# Patient Record
Sex: FEMALE | Race: WHITE | NOT HISPANIC OR LATINO | Employment: OTHER | URBAN - METROPOLITAN AREA
[De-identification: names, ages, dates, MRNs, and addresses within clinical notes are randomized per-mention and may not be internally consistent; named-entity substitution may affect disease eponyms.]

---

## 2018-09-28 ENCOUNTER — HOSPITAL ENCOUNTER (OUTPATIENT)
Facility: HOSPITAL | Age: 83
Discharge: HOME | End: 2018-10-01
Attending: HOSPITALIST | Admitting: HOSPITALIST
Payer: MEDICARE

## 2018-09-28 DIAGNOSIS — K92.1 MELENA: ICD-10-CM

## 2018-09-28 DIAGNOSIS — H91.90 HEARING LOSS, UNSPECIFIED HEARING LOSS TYPE, UNSPECIFIED LATERALITY: ICD-10-CM

## 2018-09-28 DIAGNOSIS — H33.22 LEFT RETINAL DETACHMENT: ICD-10-CM

## 2018-09-28 DIAGNOSIS — K63.5 POLYP OF COLON, UNSPECIFIED PART OF COLON, UNSPECIFIED TYPE: ICD-10-CM

## 2018-09-28 DIAGNOSIS — K92.2 LOWER GI BLEED: Primary | ICD-10-CM

## 2018-09-28 PROCEDURE — 85025 COMPLETE CBC W/AUTO DIFF WBC: CPT

## 2018-09-28 PROCEDURE — 36415 COLL VENOUS BLD VENIPUNCTURE: CPT

## 2018-09-28 PROCEDURE — 84100 ASSAY OF PHOSPHORUS: CPT

## 2018-09-28 PROCEDURE — 99220 PR INITIAL OBSERVATION CARE,LEVL III: ICD-10-PCS | Mod: ,,, | Performed by: FAMILY MEDICINE

## 2018-09-28 PROCEDURE — 99220 PR INITIAL OBSERVATION CARE,LEVL III: CPT | Mod: ,,, | Performed by: FAMILY MEDICINE

## 2018-09-28 PROCEDURE — G0379 DIRECT REFER HOSPITAL OBSERV: HCPCS

## 2018-09-28 PROCEDURE — G0378 HOSPITAL OBSERVATION PER HR: HCPCS

## 2018-09-28 PROCEDURE — 86901 BLOOD TYPING SEROLOGIC RH(D): CPT

## 2018-09-28 PROCEDURE — 86920 COMPATIBILITY TEST SPIN: CPT

## 2018-09-28 PROCEDURE — 83735 ASSAY OF MAGNESIUM: CPT

## 2018-09-28 PROCEDURE — 80053 COMPREHEN METABOLIC PANEL: CPT

## 2018-09-28 RX ORDER — PANTOPRAZOLE SODIUM 40 MG/10ML
40 INJECTION, POWDER, LYOPHILIZED, FOR SOLUTION INTRAVENOUS DAILY
Status: DISCONTINUED | OUTPATIENT
Start: 2018-09-28 | End: 2018-10-01 | Stop reason: HOSPADM

## 2018-09-28 RX ORDER — HYDROCODONE BITARTRATE AND ACETAMINOPHEN 500; 5 MG/1; MG/1
TABLET ORAL
Status: DISCONTINUED | OUTPATIENT
Start: 2018-09-29 | End: 2018-10-01 | Stop reason: HOSPADM

## 2018-09-29 LAB
ABO + RH BLD: NORMAL
ALBUMIN SERPL BCP-MCNC: 3 G/DL
ALP SERPL-CCNC: 79 U/L
ALT SERPL W/O P-5'-P-CCNC: 7 U/L
ANION GAP SERPL CALC-SCNC: 9 MMOL/L
AST SERPL-CCNC: 14 U/L
BASOPHILS # BLD AUTO: 0.03 K/UL
BASOPHILS NFR BLD: 0.5 %
BILIRUB SERPL-MCNC: 0.4 MG/DL
BLD GP AB SCN CELLS X3 SERPL QL: NORMAL
BUN SERPL-MCNC: 22 MG/DL
CALCIUM SERPL-MCNC: 8.5 MG/DL
CHLORIDE SERPL-SCNC: 104 MMOL/L
CO2 SERPL-SCNC: 26 MMOL/L
CREAT SERPL-MCNC: 0.7 MG/DL
DIFFERENTIAL METHOD: ABNORMAL
EOSINOPHIL # BLD AUTO: 0.1 K/UL
EOSINOPHIL NFR BLD: 1.4 %
ERYTHROCYTE [DISTWIDTH] IN BLOOD BY AUTOMATED COUNT: 15.3 %
EST. GFR  (AFRICAN AMERICAN): >60 ML/MIN/1.73 M^2
EST. GFR  (NON AFRICAN AMERICAN): >60 ML/MIN/1.73 M^2
GLUCOSE SERPL-MCNC: 110 MG/DL
HCT VFR BLD AUTO: 32.3 %
HCT VFR BLD AUTO: 33.3 %
HCT VFR BLD AUTO: 33.6 %
HCT VFR BLD AUTO: 34 %
HGB BLD-MCNC: 10.1 G/DL
HGB BLD-MCNC: 10.2 G/DL
HGB BLD-MCNC: 10.4 G/DL
HGB BLD-MCNC: 10.7 G/DL
LYMPHOCYTES # BLD AUTO: 1.6 K/UL
LYMPHOCYTES NFR BLD: 25.6 %
MAGNESIUM SERPL-MCNC: 1.8 MG/DL
MCH RBC QN AUTO: 25.4 PG
MCHC RBC AUTO-ENTMCNC: 30.3 G/DL
MCV RBC AUTO: 84 FL
MONOCYTES # BLD AUTO: 0.4 K/UL
MONOCYTES NFR BLD: 6.3 %
NEUTROPHILS # BLD AUTO: 4.1 K/UL
NEUTROPHILS NFR BLD: 66 %
PHOSPHATE SERPL-MCNC: 3.7 MG/DL
PLATELET # BLD AUTO: 209 K/UL
PMV BLD AUTO: 9.2 FL
POTASSIUM SERPL-SCNC: 4.3 MMOL/L
PROT SERPL-MCNC: 5.4 G/DL
RBC # BLD AUTO: 3.98 M/UL
SODIUM SERPL-SCNC: 139 MMOL/L
WBC # BLD AUTO: 6.24 K/UL

## 2018-09-29 PROCEDURE — 93010 EKG 12-LEAD: ICD-10-PCS | Mod: ,,, | Performed by: INTERNAL MEDICINE

## 2018-09-29 PROCEDURE — 93005 ELECTROCARDIOGRAM TRACING: CPT

## 2018-09-29 PROCEDURE — 96374 THER/PROPH/DIAG INJ IV PUSH: CPT

## 2018-09-29 PROCEDURE — C9113 INJ PANTOPRAZOLE SODIUM, VIA: HCPCS | Performed by: FAMILY MEDICINE

## 2018-09-29 PROCEDURE — 96376 TX/PRO/DX INJ SAME DRUG ADON: CPT

## 2018-09-29 PROCEDURE — G0378 HOSPITAL OBSERVATION PER HR: HCPCS

## 2018-09-29 PROCEDURE — 63600175 PHARM REV CODE 636 W HCPCS: Performed by: FAMILY MEDICINE

## 2018-09-29 PROCEDURE — 85014 HEMATOCRIT: CPT | Mod: 91

## 2018-09-29 PROCEDURE — 85018 HEMOGLOBIN: CPT | Mod: 91

## 2018-09-29 PROCEDURE — 94761 N-INVAS EAR/PLS OXIMETRY MLT: CPT

## 2018-09-29 PROCEDURE — 93010 ELECTROCARDIOGRAM REPORT: CPT | Mod: ,,, | Performed by: INTERNAL MEDICINE

## 2018-09-29 PROCEDURE — 36415 COLL VENOUS BLD VENIPUNCTURE: CPT

## 2018-09-29 RX ORDER — ONDANSETRON 8 MG/1
8 TABLET, ORALLY DISINTEGRATING ORAL EVERY 8 HOURS PRN
Status: DISCONTINUED | OUTPATIENT
Start: 2018-09-29 | End: 2018-10-01 | Stop reason: HOSPADM

## 2018-09-29 RX ORDER — ACETAMINOPHEN 325 MG/1
650 TABLET ORAL EVERY 4 HOURS PRN
Status: DISCONTINUED | OUTPATIENT
Start: 2018-09-29 | End: 2018-10-01 | Stop reason: HOSPADM

## 2018-09-29 RX ORDER — SODIUM CHLORIDE 0.9 % (FLUSH) 0.9 %
3 SYRINGE (ML) INJECTION
Status: DISCONTINUED | OUTPATIENT
Start: 2018-09-29 | End: 2018-10-01 | Stop reason: HOSPADM

## 2018-09-29 RX ADMIN — PANTOPRAZOLE SODIUM 40 MG: 40 INJECTION, POWDER, FOR SOLUTION INTRAVENOUS at 12:09

## 2018-09-29 RX ADMIN — PANTOPRAZOLE SODIUM 40 MG: 40 INJECTION, POWDER, FOR SOLUTION INTRAVENOUS at 10:09

## 2018-09-29 NOTE — PLAN OF CARE
09/29/18 0900   PRE-TX-O2-ETCO2   O2 Device (Oxygen Therapy) room air   SpO2 95 %   $ Pulse Oximetry - Multiple Charge Pulse Oximetry - Multiple

## 2018-09-29 NOTE — H&P
[facility]  History & Physical    Patient Name: Yeimi Webb  MRN: [id number]  Admission Date: 9/28/2018  Attending Physician: Chicho Mistry MD   Primary Care Provider: Primary Doctor No         Patient information was obtained from patient, relative(s) and ER records.     Subjective:     Principal Problem:Lower GI bleed    Chief Complaint:   Chief Complaint   Patient presents with    GI Bleeding     Transfer from [facility] for GI evaluation        HPI: 89 yr old pleasant white female with left eye blindness, retinal detachment left, hearing loss, presented to [facility] with active rectal bleeding. Her daughter noticed 2 episodes since she noted bleeding in the bathroom. Patient unaware at that time. No abdominal pain/nausea/vomiting/diarrhea. She denies an y NSAID/aspirin or goody powder use. Patient [place] a year ago and then she moved here along with her daughter. Patient is unaware about her last colonoscopy. No other complaints. On presentation, she is found to have active rectal bleeding and she is admitted for observation, transfusion if needed and GI consultation.    Past Medical History:   Diagnosis Date    Blind left eye     Hard of hearing        No past surgical history on file.    Review of patient's allergies indicates:   Allergen Reactions    Lactose Diarrhea       No current facility-administered medications on file prior to encounter.      No current outpatient medications on file prior to encounter.     Family History     None        Tobacco Use    Smoking status: Never Smoker   Substance and Sexual Activity    Alcohol use: Yes     Frequency: Never     Comment: socially     Drug use: No    Sexual activity: No     Review of Systems   Constitutional: Negative.  Negative for activity change, diaphoresis and unexpected weight change.   HENT: Positive for hearing loss. Negative for congestion,  ear discharge, rhinorrhea, sore throat and voice change.    Eyes: Positive for visual disturbance. Negative for pain and discharge.   Respiratory: Negative.  Negative for chest tightness, shortness of breath and wheezing.    Cardiovascular: Negative.  Negative for chest pain.   Gastrointestinal: Positive for blood in stool. Negative for abdominal distention, anal bleeding, constipation and nausea.   Endocrine: Negative.  Negative for cold intolerance, polydipsia and polyuria.   Genitourinary: Negative.  Negative for decreased urine volume, difficulty urinating, dysuria, frequency, menstrual problem and vaginal pain.   Musculoskeletal: Negative.  Negative for arthralgias, gait problem and myalgias.   Skin: Negative.  Negative for color change, pallor and wound.   Allergic/Immunologic: Negative.  Negative for environmental allergies and immunocompromised state.   Neurological: Negative.  Negative for dizziness, tremors, seizures, speech difficulty and headaches.   Hematological: Negative.  Negative for adenopathy. Does not bruise/bleed easily.   Psychiatric/Behavioral: Negative.  Negative for agitation, confusion, decreased concentration, hallucinations, self-injury and suicidal ideas. The patient is not nervous/anxious.      Objective:     Vital Signs (Most Recent):  Temp: 97.5 °F (36.4 °C) (09/28/18 2230)  Pulse: 66 (09/28/18 2230)  Resp: 18 (09/28/18 2230)  BP: (!) 178/84 (09/28/18 2230)  SpO2: 96 % (09/28/18 2230) Vital Signs (24h Range):  Temp:  [97.5 °F (36.4 °C)-98.5 °F (36.9 °C)] 97.5 °F (36.4 °C)  Pulse:  [65-79] 66  Resp:  [15-19] 18  SpO2:  [95 %-98 %] 96 %  BP: (142-178)/(68-93) 178/84        There is no height or weight on file to calculate BMI.    Physical Exam   Constitutional: She is oriented to person, place, and time. She appears well-developed and well-nourished. No distress.   HENT:   Head: Normocephalic and atraumatic.   Right Ear: External ear normal.   Left Ear: External ear normal.   Nose: Nose  normal.   Mouth/Throat: Oropharynx is clear and moist. No oropharyngeal exudate.   Eyes: Conjunctivae and EOM are normal. Pupils are equal, round, and reactive to light. Right eye exhibits no discharge. Left eye exhibits no discharge. No scleral icterus.   Neck: Normal range of motion. Neck supple. No JVD present. No tracheal deviation present. No thyromegaly present.   Cardiovascular: Normal rate, regular rhythm, normal heart sounds and intact distal pulses. Exam reveals no gallop and no friction rub.   No murmur heard.  Pulmonary/Chest: Effort normal and breath sounds normal. No stridor. She has no wheezes. She has no rales. She exhibits no tenderness.   Abdominal: Soft. Bowel sounds are normal. She exhibits no distension and no mass. There is no tenderness. There is no rebound and no guarding. No hernia.   Active rectal bleeding+   Musculoskeletal: Normal range of motion. She exhibits no edema or tenderness.   Lymphadenopathy:     She has no cervical adenopathy.   Neurological: She is alert and oriented to person, place, and time. She has normal reflexes. She displays normal reflexes. No cranial nerve deficit. She exhibits normal muscle tone. Coordination normal.   Skin: Skin is warm and dry. No rash noted. She is not diaphoretic. No erythema. No pallor.   Psychiatric: She has a normal mood and affect. Her behavior is normal. Judgment and thought content normal.         CRANIAL NERVES     CN III, IV, VI   Pupils are equal, round, and reactive to light.  Extraocular motions are normal.       Significant Labs:   Admission on 09/28/2018, Discharged on 09/28/2018   Component Date Value Ref Range Status    WBC 09/28/2018 7.21  3.90 - 12.70 K/uL Final    RBC 09/28/2018 5.15  4.00 - 5.40 M/uL Final    Hemoglobin 09/28/2018 13.5  12.0 - 16.0 g/dL Final    Hematocrit 09/28/2018 43.2  37.0 - 48.5 % Final    MCV 09/28/2018 84  82 - 98 fL Final    MCH 09/28/2018 26.2* 27.0 - 31.0 pg Final    MCHC 09/28/2018 31.3* 32.0  - 36.0 g/dL Final    RDW 09/28/2018 15.5* 11.5 - 14.5 % Final    Platelets 09/28/2018 282  150 - 350 K/uL Final    MPV 09/28/2018 9.9  9.2 - 12.9 fL Final    Gran # (ANC) 09/28/2018 4.8  1.8 - 7.7 K/uL Final    Lymph # 09/28/2018 1.9  1.0 - 4.8 K/uL Final    Mono # 09/28/2018 0.4  0.3 - 1.0 K/uL Final    Eos # 09/28/2018 0.1  0.0 - 0.5 K/uL Final    Baso # 09/28/2018 0.03  0.00 - 0.20 K/uL Final    Gran% 09/28/2018 65.9  38.0 - 73.0 % Final    Lymph% 09/28/2018 26.6  18.0 - 48.0 % Final    Mono% 09/28/2018 5.7  4.0 - 15.0 % Final    Eosinophil% 09/28/2018 1.4  0.0 - 8.0 % Final    Basophil% 09/28/2018 0.4  0.0 - 1.9 % Final    Differential Method 09/28/2018 Automated   Final    Sodium 09/28/2018 139  136 - 145 mmol/L Final    Potassium 09/28/2018 4.3  3.5 - 5.1 mmol/L Final    Chloride 09/28/2018 100  95 - 110 mmol/L Final    CO2 09/28/2018 32* 23 - 29 mmol/L Final    Glucose 09/28/2018 105  70 - 110 mg/dL Final    BUN, Bld 09/28/2018 20* 7 - 17 mg/dL Final    Creatinine 09/28/2018 0.67  0.50 - 1.40 mg/dL Final    Calcium 09/28/2018 8.9  8.7 - 10.5 mg/dL Final    Total Protein 09/28/2018 7.0  6.0 - 8.4 g/dL Final    Albumin 09/28/2018 4.0  3.5 - 5.2 g/dL Final    Total Bilirubin 09/28/2018 0.2  0.1 - 1.0 mg/dL Final    Comment: For infants and newborns, interpretation of results should be based  on gestational age, weight and in agreement with clinical  observations.  Premature Infant recommended reference ranges:  Up to 24 hours.............<8.0 mg/dL  Up to 48 hours............<12.0 mg/dL  3-5 days..................<15.0 mg/dL  6-29 days.................<15.0 mg/dL      Alkaline Phosphatase 09/28/2018 94  38 - 126 U/L Final    AST 09/28/2018 21  15 - 46 U/L Final    ALT 09/28/2018 12  10 - 44 U/L Final    Anion Gap 09/28/2018 7* 8 - 16 mmol/L Final    eGFR if African American 09/28/2018 >60.0  >60 mL/min/1.73 m^2 Final    eGFR if non African American 09/28/2018 >60.0  >60 mL/min/1.73  m^2 Final    Comment: Calculation used to obtain the estimated glomerular filtration  rate (eGFR) is the CKD-EPI equation.       Prothrombin Time 09/28/2018 12.2  9.0 - 12.5 sec Final    INR 09/28/2018 1.0  0.8 - 1.2 Final    Comment: Coumadin Therapy:  2.0 - 3.0 for INR for all indicators except mechanical heart valves  and antiphospholipid syndromes which should use 2.5 - 3.5.      ABO 09/28/2018 A   Final    Rh Type 09/28/2018 NEG   Final    Antibody Screen 09/28/2018 NEG   Final    Occult Blood 09/28/2018 Positive* Negative Final         Significant Imaging: none            Assessment/Plan:     Active Diagnoses:    Diagnosis Date Noted POA    PRINCIPAL PROBLEM:  Lower GI bleed [K92.2] 09/28/2018 Yes    Hard of hearing [H91.90] 09/28/2018 Yes    Left retinal detachment [H33.22] 09/28/2018 Yes      Problems Resolved During this Admission:     VTE Risk Mitigation (From admission, onward)    None        Admit to Hospital Medicine    1. Active lower GI bleeding  -DD diverticulosis, polyp, mass  -serial labs and H/H  -PRBC prepare and hold - transfuse of H/H drop below 8  -consult GI for possible colonoscopy  -NPO    2. Prophylaxis  -SCD, PPI    Spent adequate time in obtaining history and explaining differentials      Miguel Concepcion MD  Department of Hospital Medicine   [facility]

## 2018-09-29 NOTE — ASSESSMENT & PLAN NOTE
Multiple episodes of rectal bleeding noted per patient's daughter. Occult stool positive. Evidence of BRB on exam in ED. No abdominal pain, N/V, diarrhea.    -Hgb stable 10.1>10.7. Continue to trend H/H  -PRBC prepared and held - transfuse as needed to keep hgb > 8  -Continue SCD and PPI for prophylaxis  -GI consulted for possible colonoscopy  -NPO

## 2018-09-29 NOTE — PROGRESS NOTES
"[facility]  Progress Note    Patient Name: Yeimi Webb  MRN: [id number]  Patient Class: OP- Observation   Admission Date: 9/28/2018  Length of Stay: 0 days  Attending Physician: Chicho Mistry MD  Primary Care Provider: Primary Doctor No      Subjective:     Principal Problem:Lower GI bleed    HPI:  Ms. Webb is a 89 y/o pleasant white female with left eye blindness, retinal detachment left, and hearing loss who presented to [facility] with active rectal bleeding. Her daughter noticed 2 episodes since she noted bleeding in the bathroom. Patient unaware at that time. No abdominal pain/nausea/vomiting/diarrhea. She denies any NSAID/aspirin or goody powder use. Patient [place] a year ago and then she moved here along with her daughter. Patient is unaware about her last colonoscopy. She denies prior similar episodes. She denies any prior surgeries or hospitalizations. No other complaints. On presentation, she is found to have stable vital signs with active rectal bleeding and drop in hemoglobin/hematocrit from 13.5/43.2 to 10.1/33.3. She was transferred to [facility] and admitted to [facility] for observation, transfusion if needed and GI consultation.    Hospital Course:  No notes on file    Interval History: Pt seen and examined at bedside. Patient's daughter is present. Pt upset that she can't have breakfast and coffee due to NPO status. States she "just wants to get this over with and go home." Denies BM or bleeding this morning. Denies abdominal pain, N/V, fever, chills, or any acute complaints.     Review of Systems   Constitutional: Negative for appetite change, chills, fatigue, fever and unexpected weight change.   HENT: Positive for hearing loss.    Eyes: Positive for visual disturbance.   Respiratory: Negative for cough and shortness of breath.    Cardiovascular: Negative for chest " pain, palpitations and leg swelling.   Gastrointestinal: Positive for blood in stool. Negative for abdominal distention, abdominal pain, constipation, diarrhea, nausea, rectal pain and vomiting.   Genitourinary: Negative for difficulty urinating and dysuria.   Musculoskeletal: Negative for arthralgias, gait problem, myalgias, neck pain and neck stiffness.   Skin: Negative for pallor and wound.   Neurological: Negative for syncope, weakness and headaches.   Psychiatric/Behavioral: Positive for agitation.   All other systems reviewed and are negative.    Objective:     Vital Signs (Most Recent):  Temp: 97.8 °F (36.6 °C) (09/29/18 0800)  Pulse: 70 (09/29/18 0800)  Resp: 18 (09/29/18 0800)  BP: (!) 148/70 (09/29/18 0800)  SpO2: 96 % (09/29/18 0800) Vital Signs (24h Range):  Temp:  [96 °F (35.6 °C)-98.5 °F (36.9 °C)] 97.8 °F (36.6 °C)  Pulse:  [59-79] 70  Resp:  [15-19] 18  SpO2:  [95 %-98 %] 96 %  BP: (142-178)/(68-93) 148/70     Weight: 61.5 kg (135 lb 9.3 oz)  Body mass index is 24.8 kg/m².  No intake or output data in the 24 hours ending 09/29/18 0911   Physical Exam   Constitutional: She is oriented to person, place, and time. She appears well-developed and well-nourished. No distress.   HENT:   Head: Normocephalic and atraumatic.   Hearing aids in place.   Eyes: Conjunctivae and EOM are normal.   Neck: Normal range of motion. Neck supple.   Cardiovascular: Normal rate, regular rhythm, normal heart sounds and intact distal pulses.   No murmur heard.  Pulmonary/Chest: Effort normal and breath sounds normal. No respiratory distress.   Abdominal: Soft. Bowel sounds are normal. She exhibits no distension. There is no tenderness. There is no guarding.   Neurological: She is alert and oriented to person, place, and time. GCS eye subscore is 4. GCS verbal subscore is 5. GCS motor subscore is 6.   Skin: Skin is warm and dry. Capillary refill takes less than 2 seconds. She is not diaphoretic. No pallor.   Psychiatric: She  has a normal mood and affect. Her speech is normal. Thought content normal. She is agitated. Cognition and memory are normal.   Nursing note and vitals reviewed.      Significant Labs: All pertinent labs within the past 24 hours have been reviewed.    Significant Imaging: I have reviewed all pertinent imaging results/findings within the past 24 hours.    Assessment/Plan:      * Lower GI bleed    Multiple episodes of rectal bleeding noted per patient's daughter. Occult stool positive. Evidence of BRB on exam in ED. No abdominal pain, N/V, diarrhea.    -Hgb stable 10.1>10.7. Continue to trend H/H  -PRBC prepared and held - transfuse as needed to keep hgb > 8  -Continue SCD and PPI for prophylaxis  -GI consulted  -Clear liquid diet for now        Left retinal detachment    30 years ago. Blind in left eye          Hard of hearing    Uses hearing aids            VTE Risk Mitigation (From admission, onward)        Ordered     Reason for No Pharmacological VTE Prophylaxis  Once      09/29/18 0841     Place GIOVANNI hose  Until discontinued      09/29/18 0841     Place sequential compression device  Until discontinued      09/29/18 0841     IP VTE HIGH RISK PATIENT  Once      09/29/18 0841          Yasmine Torrez PA-C  Department of Hospital Medicine   [facility]

## 2018-09-29 NOTE — SUBJECTIVE & OBJECTIVE
"Interval History: Pt seen and examined at bedside. No family is present. Pt upset that she can't have breakfast and coffee due to NPO status. States she "just wants to get this over with and go home." Denies BM or bleeding this morning. Denies abdominal pain, N/V, fever, chills, or any acute complaints.     Review of Systems   Constitutional: Negative for appetite change, chills, fatigue, fever and unexpected weight change.   HENT: Positive for hearing loss.    Eyes: Positive for visual disturbance.   Respiratory: Negative for cough and shortness of breath.    Cardiovascular: Negative for chest pain, palpitations and leg swelling.   Gastrointestinal: Positive for blood in stool. Negative for abdominal distention, abdominal pain, constipation, diarrhea, nausea, rectal pain and vomiting.   Genitourinary: Negative for difficulty urinating and dysuria.   Musculoskeletal: Negative for arthralgias, gait problem, myalgias, neck pain and neck stiffness.   Skin: Negative for pallor and wound.   Neurological: Negative for syncope, weakness and headaches.   Psychiatric/Behavioral: Positive for agitation.   All other systems reviewed and are negative.    Objective:     Vital Signs (Most Recent):  Temp: 97.8 °F (36.6 °C) (09/29/18 0800)  Pulse: 70 (09/29/18 0800)  Resp: 18 (09/29/18 0800)  BP: (!) 148/70 (09/29/18 0800)  SpO2: 96 % (09/29/18 0800) Vital Signs (24h Range):  Temp:  [96 °F (35.6 °C)-98.5 °F (36.9 °C)] 97.8 °F (36.6 °C)  Pulse:  [59-79] 70  Resp:  [15-19] 18  SpO2:  [95 %-98 %] 96 %  BP: (142-178)/(68-93) 148/70     Weight: 61.5 kg (135 lb 9.3 oz)  Body mass index is 24.8 kg/m².  No intake or output data in the 24 hours ending 09/29/18 0911   Physical Exam   Constitutional: She is oriented to person, place, and time. She appears well-developed and well-nourished. No distress.   HENT:   Head: Normocephalic and atraumatic.   Hearing aids in place.   Eyes: Conjunctivae and EOM are normal.   Neck: Normal range of " motion. Neck supple.   Cardiovascular: Normal rate, regular rhythm, normal heart sounds and intact distal pulses.   No murmur heard.  Pulmonary/Chest: Effort normal and breath sounds normal. No respiratory distress.   Abdominal: Soft. Bowel sounds are normal. She exhibits no distension. There is no tenderness. There is no guarding.   Neurological: She is alert and oriented to person, place, and time. GCS eye subscore is 4. GCS verbal subscore is 5. GCS motor subscore is 6.   Skin: Skin is warm and dry. Capillary refill takes less than 2 seconds. She is not diaphoretic. No pallor.   Psychiatric: She has a normal mood and affect. Her speech is normal. Thought content normal. She is agitated. Cognition and memory are normal.   Nursing note and vitals reviewed.      Significant Labs: All pertinent labs within the past 24 hours have been reviewed.    Significant Imaging: I have reviewed all pertinent imaging results/findings within the past 24 hours.

## 2018-09-29 NOTE — HPI
Ms. Webb is a 89 y/o pleasant white female with left eye blindness, retinal detachment left, and hearing loss who presented to [facility] with active rectal bleeding. Her daughter noticed 2 episodes since she noted bleeding in the bathroom. Patient unaware at that time. No abdominal pain/nausea/vomiting/diarrhea. She denies any NSAID/aspirin or goody powder use. Patient [place] a year ago and then she moved here along with her daughter. Patient is unaware about her last colonoscopy. She denies prior similar episodes. She denies any prior surgeries or hospitalizations. No other complaints. On presentation, she is found to have stable vital signs with active rectal bleeding and drop in hemoglobin/hematocrit from 13.5/43.2 to 10.1/33.3. She was transferred to [facility] and admitted to [facility] for observation, transfusion if needed and GI consultation.

## 2018-09-29 NOTE — CONSULTS
"[facility]    CC: rectal bleeding    HPI 89 y.o. female with reduced hearing and L-eye blindness but without other reported PMH; presents from home (lives with daughter) with episodic, painless, light-red rectal bleeding of duration less than one week, associated with family-noticed blood in stool but not associated with nausea, vomiting, dizziness, hematemesis, NSAID use, or alcohol use. She reports not noticing this herself but that her daughter noticed and brought her for evaluation. She does not recall colonoscopy history. Nursing here has noticed some clots in her stool.    Past Medical History:   Diagnosis Date    Blind left eye     Hard of hearing      Past Surgical History:   Procedure Laterality Date    EYE SURGERY Left     30 years ago     Social History  - Tobacco: denies  - EtOH: denies  - Illicits: denies    Family history: denies family history of colorectal cancer, reports overall her family is very healthy.    Review of Systems  General ROS: negative for chills, fever or weight loss  Psychological ROS: negative for hallucination, depression  Ophthalmic ROS: negative for  photophobia or eye pain  ENT ROS: negative for epistaxis, sore throat or rhinorrhea  Respiratory ROS: no cough, shortness of breath, or wheezing  Cardiovascular ROS: no chest pain or dyspnea on exertion  Gastrointestinal ROS: Rectal bleeding, no abdominal pain.  Genito-Urinary ROS: no dysuria or hematuria  Musculoskeletal ROS: negative for gait disturbance or muscular weakness  Neurological ROS: no syncope or seizures  Dermatological ROS: negative for pruritis, rash and jaundice    Physical Examination  BP (!) 141/65 (BP Location: Left arm, Patient Position: Lying)   Pulse 65   Temp 98.9 °F (37.2 °C) (Oral)   Resp 18   Ht 5' 2" (1.575 m)   Wt 61.5 kg (135 lb 9.3 oz)   SpO2 (!) 94%   Breastfeeding? No   BMI 24.80 kg/m²   General appearance: alert, cooperative, no distress  HENT: Normocephalic, atraumatic, neck " symmetrical, no nasal discharge   Eyes: strabismus and opacification of L eye. EOM's intact  Lungs: clear to auscultation bilaterally, no dullness to percussion bilaterally  Heart: regular rate and rhythm without rub; no displacement of the PMI   Abdomen: soft, non-tender; bowel sounds normoactive. No rebound or rigidity. No palpated organomegaly.  Extremities: extremities symmetric; no cyanosis or edema  Integument: Skin color, texture, turgor normal; no rashes; hair distrubution normal  Neurologic: Alert and oriented X 3, normal strength  Psychiatric: no pressured speech; normal affect    Labs:  Lab Results   Component Value Date    WBC 6.24 09/28/2018    HGB 10.7 (L) 09/29/2018    HCT 34.0 (L) 09/29/2018    MCV 84 09/28/2018     09/28/2018     Lab Results   Component Value Date    INR 1.0 09/28/2018     Lab Results   Component Value Date    ALT 7 (L) 09/28/2018    AST 14 09/28/2018    ALKPHOS 79 09/28/2018    BILITOT 0.4 09/28/2018     Imaging:  Imaging reviewed, no current available studies.    Called daughter to review assessment and plan but unavailable by phone on attempt, will retry in near future.    Assessment:   89 y.o. female pleasant patient with low-normocytic anemia in setting of recently noticed rectal bleeding/clots in stools by family. Hemodynamically stable and hgb has remained stable here 10.1 - 10.7 since resuscitation but by report has still been having clots per rectum. Suspected lower GI etiology.    Plan:   - Clear liquid diet for now  - Trend hgb with CBC in AM. Monitor all stools for ongoing blood.  - If hgb dropping and/or ongoing rectal bleeding tomorrow will discuss preparation and endoscopic planning  - Goal hgb > 7  - GI will follow    Discussed in brief and will formally staff with attending GI physician Dr. Hicks with any additional recs / addenda to follow in attestation as needed.  Son Huff MD PGY-4  [facility] Fellow

## 2018-09-29 NOTE — PLAN OF CARE
Problem: Fall Risk (Adult)  Goal: Absence of Falls  Patient will demonstrate the desired outcomes by discharge/transition of care.  Outcome: Ongoing (interventions implemented as appropriate)  Bed in low position and locked. Alarms on and audible. Call light within reach. Education given on preventing falls and using call light. Undersatnding verbalized. Patient Hard of hearing and needs repetition many times. Blood levels monitored also

## 2018-09-29 NOTE — PLAN OF CARE
Problem: Patient Care Overview  Goal: Plan of Care Review  Plan of care reviewed with patient. Voices understanding. NSR on monitor with no red alarms noted.Fall and safety precaution maintained. Side rails up x3, bed low, bed alarm on, call bell within reach.Patients H and H monitored during shift last H and H 10.7 34.4.  Will continue to monitor.

## 2018-09-30 ENCOUNTER — ANESTHESIA EVENT (OUTPATIENT)
Dept: ENDOSCOPY | Facility: HOSPITAL | Age: 83
End: 2018-09-30
Payer: MEDICARE

## 2018-09-30 LAB
ALBUMIN SERPL BCP-MCNC: 3 G/DL
ALP SERPL-CCNC: 76 U/L
ALT SERPL W/O P-5'-P-CCNC: 6 U/L
ANION GAP SERPL CALC-SCNC: 7 MMOL/L
AST SERPL-CCNC: 14 U/L
BASOPHILS # BLD AUTO: 0.03 K/UL
BASOPHILS NFR BLD: 0.6 %
BILIRUB SERPL-MCNC: 0.5 MG/DL
BUN SERPL-MCNC: 14 MG/DL
CALCIUM SERPL-MCNC: 8.5 MG/DL
CHLORIDE SERPL-SCNC: 104 MMOL/L
CO2 SERPL-SCNC: 26 MMOL/L
CREAT SERPL-MCNC: 0.6 MG/DL
DIFFERENTIAL METHOD: ABNORMAL
EOSINOPHIL # BLD AUTO: 0.2 K/UL
EOSINOPHIL NFR BLD: 3.4 %
ERYTHROCYTE [DISTWIDTH] IN BLOOD BY AUTOMATED COUNT: 15.3 %
EST. GFR  (AFRICAN AMERICAN): >60 ML/MIN/1.73 M^2
EST. GFR  (NON AFRICAN AMERICAN): >60 ML/MIN/1.73 M^2
GLUCOSE SERPL-MCNC: 87 MG/DL
HCT VFR BLD AUTO: 32.4 %
HGB BLD-MCNC: 10.1 G/DL
LYMPHOCYTES # BLD AUTO: 2.2 K/UL
LYMPHOCYTES NFR BLD: 40.9 %
MCH RBC QN AUTO: 26 PG
MCHC RBC AUTO-ENTMCNC: 31.2 G/DL
MCV RBC AUTO: 83 FL
MONOCYTES # BLD AUTO: 0.4 K/UL
MONOCYTES NFR BLD: 8.2 %
NEUTROPHILS # BLD AUTO: 2.5 K/UL
NEUTROPHILS NFR BLD: 46.7 %
PLATELET # BLD AUTO: 226 K/UL
PMV BLD AUTO: 9.5 FL
POTASSIUM SERPL-SCNC: 4 MMOL/L
PROT SERPL-MCNC: 5.4 G/DL
RBC # BLD AUTO: 3.89 M/UL
SODIUM SERPL-SCNC: 137 MMOL/L
WBC # BLD AUTO: 5.35 K/UL

## 2018-09-30 PROCEDURE — 25000003 PHARM REV CODE 250: Performed by: STUDENT IN AN ORGANIZED HEALTH CARE EDUCATION/TRAINING PROGRAM

## 2018-09-30 PROCEDURE — 36415 COLL VENOUS BLD VENIPUNCTURE: CPT

## 2018-09-30 PROCEDURE — 94761 N-INVAS EAR/PLS OXIMETRY MLT: CPT

## 2018-09-30 PROCEDURE — C9113 INJ PANTOPRAZOLE SODIUM, VIA: HCPCS | Performed by: FAMILY MEDICINE

## 2018-09-30 PROCEDURE — G0378 HOSPITAL OBSERVATION PER HR: HCPCS

## 2018-09-30 PROCEDURE — 85025 COMPLETE CBC W/AUTO DIFF WBC: CPT

## 2018-09-30 PROCEDURE — 63600175 PHARM REV CODE 636 W HCPCS: Performed by: FAMILY MEDICINE

## 2018-09-30 PROCEDURE — 96376 TX/PRO/DX INJ SAME DRUG ADON: CPT

## 2018-09-30 PROCEDURE — 80053 COMPREHEN METABOLIC PANEL: CPT

## 2018-09-30 RX ORDER — POLYETHYLENE GLYCOL 3350, SODIUM SULFATE ANHYDROUS, SODIUM BICARBONATE, SODIUM CHLORIDE, POTASSIUM CHLORIDE 236; 22.74; 6.74; 5.86; 2.97 G/4L; G/4L; G/4L; G/4L; G/4L
4000 POWDER, FOR SOLUTION ORAL ONCE
Status: COMPLETED | OUTPATIENT
Start: 2018-09-30 | End: 2018-09-30

## 2018-09-30 RX ADMIN — PANTOPRAZOLE SODIUM 40 MG: 40 INJECTION, POWDER, FOR SOLUTION INTRAVENOUS at 10:09

## 2018-09-30 RX ADMIN — POLYETHYLENE GLYCOL 3350, SODIUM SULFATE ANHYDROUS, SODIUM BICARBONATE, SODIUM CHLORIDE, POTASSIUM CHLORIDE 4000 ML: 236; 22.74; 6.74; 5.86; 2.97 POWDER, FOR SOLUTION ORAL at 07:09

## 2018-09-30 NOTE — HOSPITAL COURSE
Gastroenterology was consulted and recommended clear liquid diet with close monitoring of H/H and for signs of active bleeding. Hemoglobin remained stable at 10.7 > 10.1> 10.7. She completed bowel prep on 9/30/18 and was scheduled for endoscopic evaluation on 10/1/18. Her blood pressure was elevated at 208/68 and IV hydralazine was given with improvement of SBP to the 140s. Patient underwent upper endoscopy and colonoscopy on 10/1/2018 without complication. Colonoscopy showed non-bleeding internal hemorrhoids, colonic diverticula, a 5mm polyp that was resected, no evidence of ischemic colitis, malignancy or colonic angioectasias. Upper endoscopy showed tortuous esophagus, J-shaped stomach with deformity attributed to a large hiatal hernia, non-obstructing, benign-appearing Schatzki ring, no evidence of active bleeding. Hemodynamically stable and ready for discharge. She was discharged to home in good condition and will follow up with her primary care physician.

## 2018-09-30 NOTE — ANESTHESIA PREPROCEDURE EVALUATION
09/30/2018  Yeimi Webb is a 89 y.o., female with PMH of L eye blindness, and hearing loss with low-normocytic anemia in setting of recently noticed rectal bleeding/clots in stools by family. Scheduled for EGD.    Past Medical History:   Diagnosis Date    Blind left eye     Hard of hearing      Past Surgical History:   Procedure Laterality Date    EYE SURGERY Left     30 years ago     Review of patient's allergies indicates:   Allergen Reactions    Lactose Diarrhea     Recent Labs   Lab  09/30/18   0353   WBC  5.35   RBC  3.89*   HGB  10.1*   HCT  32.4*   PLT  226   MCV  83   MCH  26.0*   MCHC  31.2*     BMP  Lab Results   Component Value Date     09/30/2018    K 4.0 09/30/2018     09/30/2018    CO2 26 09/30/2018    BUN 14 09/30/2018    CREATININE 0.6 09/30/2018    CALCIUM 8.5 (L) 09/30/2018    ANIONGAP 7 (L) 09/30/2018    ESTGFRAFRICA >60 09/30/2018    EGFRNONAA >60 09/30/2018     EKG 9/29/18  Normal sinus rhythm  Anterior infarct ,age undetermined  Abnormal ECG  No previous ECGs available    ECHO: none    Anesthesia Evaluation    I have reviewed the Patient Summary Reports.     I have reviewed the Medications.     Review of Systems  Anesthesia Hx:  No previous Anesthesia  Neg history of prior surgery.   Hematology/Oncology:         -- Anemia:   Cardiovascular:   Denies Hypertension.  Denies MI.  Denies CAD.    no hyperlipidemia    Pulmonary:   Denies COPD.  Denies Asthma.  Denies Shortness of breath.    Renal/:  Renal/ Normal     Hepatic/GI:   BRBPR   Endocrine:  Endocrine Normal        Physical Exam  General:  Well nourished    Airway/Jaw/Neck:  Airway Findings: Mouth Opening: Normal Mallampati: II  TM Distance: Normal, at least 6 cm  Jaw/Neck Findings:  Neck ROM: Normal ROM      Dental:  Dental Findings: Periodontal disease, Mild   Chest/Lungs:  Chest/Lungs Clear     Heart/Vascular:  Heart Findings: Normal       Mental Status:  Mental Status Findings:  Cooperative       Lab Results   Component Value Date    WBC 6.83 10/01/2018    HGB 10.7 (L) 10/01/2018    HCT 34.5 (L) 10/01/2018     10/01/2018    ALT 6 (L) 09/30/2018    AST 14 09/30/2018     09/30/2018    K 4.0 09/30/2018     09/30/2018    CREATININE 0.6 09/30/2018    BUN 14 09/30/2018    CO2 26 09/30/2018    INR 1.0 09/28/2018         Anesthesia Plan  Type of Anesthesia, risks & benefits discussed:  Anesthesia Type:  MAC  Patient's Preference:   Intra-op Monitoring Plan: standard ASA monitors  Intra-op Monitoring Plan Comments:   Post Op Pain Control Plan:   Post Op Pain Control Plan Comments:   Induction:   IV  Beta Blocker:  Patient is not currently on a Beta-Blocker (No further documentation required).       Informed Consent: Patient representative understands risks and agrees with Anesthesia plan.  Questions answered. Anesthesia consent signed with patient representative.  ASA Score: 3     Day of Surgery Review of History & Physical:    H&P update referred to the provider.     Anesthesia Plan Notes: Consent signed with daughter, Margarette Pugh, [phone]        Ready For Surgery From Anesthesia Perspective.

## 2018-09-30 NOTE — SUBJECTIVE & OBJECTIVE
Interval History: Pt examined at bedside, resting comfortably. States she wants to eat more. States she had a normal bowel movement without evidence of blood. Nursing staff noted small amounts of blood when wiping her yesterday. Denies any pain or acute complaints.    Review of Systems   Constitutional: Negative for appetite change, chills, fatigue, fever and unexpected weight change.   HENT: Positive for hearing loss (Chronic).    Eyes: Positive for visual disturbance (Chronic).   Respiratory: Negative for cough and shortness of breath.    Cardiovascular: Negative for chest pain, palpitations and leg swelling.   Gastrointestinal: Positive for blood in stool. Negative for abdominal distention, abdominal pain, constipation, diarrhea, nausea, rectal pain and vomiting.   Skin: Negative for pallor and wound.   Neurological: Negative for syncope, weakness and headaches.   All other systems reviewed and are negative.    Objective:     Vital Signs (Most Recent):  Temp: 98.7 °F (37.1 °C) (09/30/18 1058)  Pulse: (!) 57 (09/30/18 1200)  Resp: 18 (09/30/18 1058)  BP: (!) 150/72 (09/30/18 1100)  SpO2: (!) 93 % (09/30/18 1203) Vital Signs (24h Range):  Temp:  [97 °F (36.1 °C)-98.7 °F (37.1 °C)] 98.7 °F (37.1 °C)  Pulse:  [57-93] 57  Resp:  [18] 18  SpO2:  [92 %-96 %] 93 %  BP: (142-171)/(67-99) 150/72     Weight: 61.5 kg (135 lb 9.3 oz)  Body mass index is 24.8 kg/m².    Intake/Output Summary (Last 24 hours) at 9/30/2018 1327  Last data filed at 9/30/2018 1242  Gross per 24 hour   Intake 2900 ml   Output --   Net 2900 ml      Physical Exam   Constitutional: She is oriented to person, place, and time. She appears well-developed and well-nourished. No distress.   HENT:   Hearing aids in place.   Neck: Normal range of motion. Neck supple.   Cardiovascular: Normal rate, regular rhythm, normal heart sounds and intact distal pulses.   No murmur heard.  Pulmonary/Chest: Effort normal and breath sounds normal. No respiratory distress.    Abdominal: Soft. Bowel sounds are normal. She exhibits no distension and no mass. There is no tenderness. There is no guarding.   Neurological: She is alert and oriented to person, place, and time. GCS eye subscore is 4. GCS verbal subscore is 5. GCS motor subscore is 6.   Skin: Skin is warm and dry. Capillary refill takes less than 2 seconds. She is not diaphoretic. No pallor.   Psychiatric: She has a normal mood and affect. Her speech is normal. Thought content normal. Cognition and memory are normal.   Nursing note and vitals reviewed.      Significant Labs: All pertinent labs within the past 24 hours have been reviewed.    Significant Imaging: I have reviewed all pertinent imaging results/findings within the past 24 hours.

## 2018-09-30 NOTE — PROGRESS NOTES
[facility] Hospital Medicine  Progress Note    Patient Name: Yeimi Webb  MRN: [id number]  Patient Class: OP- Observation   Admission Date: 9/28/2018  Length of Stay: 0 days  Attending Physician: Chicho Mistry MD  Primary Care Provider: Primary Doctor No      Subjective:     Principal Problem:Lower GI bleed    HPI:  Ms. Webb is a 89 y/o pleasant white female with left eye blindness, retinal detachment left, and hearing loss who presented to [facility] with active rectal bleeding. Her daughter noticed 2 episodes since she noted bleeding in the bathroom. Patient unaware at that time. No abdominal pain/nausea/vomiting/diarrhea. She denies any NSAID/aspirin or goody powder use. Patient [place] a [place] ago and then she moved here along with her daughter. Patient is unaware about her last colonoscopy. She denies prior similar episodes. She denies any prior surgeries or hospitalizations. No other complaints. On presentation, she is found to have stable vital signs with active rectal bleeding and drop in hemoglobin/hematocrit from 13.5/43.2 to 10.1/33.3. She was transferred to [facility] and admitted to [facility] for observation, transfusion if needed and GI consultation.    Hospital Course:  Cardiology was consulted and recommended clear liquid diet with close monitoring of H/H and for signs of active bleeding. Hemoglobin remained stable at 10.7 > 10.1. Vitals remained stable.    Interval History: Pt examined at bedside, resting comfortably. States she wants to eat more. States she had a normal bowel movement without evidence of blood. Nursing staff noted small amounts of blood when wiping her yesterday. Denies any pain or acute complaints.    Review of Systems   Constitutional: Negative for appetite change, chills, fatigue, fever and unexpected weight change.   HENT: Positive for hearing loss (Chronic).     Eyes: Positive for visual disturbance (Chronic).   Respiratory: Negative for cough and shortness of breath.    Cardiovascular: Negative for chest pain, palpitations and leg swelling.   Gastrointestinal: Positive for blood in stool. Negative for abdominal distention, abdominal pain, constipation, diarrhea, nausea, rectal pain and vomiting.   Skin: Negative for pallor and wound.   Neurological: Negative for syncope, weakness and headaches.   All other systems reviewed and are negative.    Objective:     Vital Signs (Most Recent):  Temp: 98.7 °F (37.1 °C) (09/30/18 1058)  Pulse: (!) 57 (09/30/18 1200)  Resp: 18 (09/30/18 1058)  BP: (!) 150/72 (09/30/18 1100)  SpO2: (!) 93 % (09/30/18 1203) Vital Signs (24h Range):  Temp:  [97 °F (36.1 °C)-98.7 °F (37.1 °C)] 98.7 °F (37.1 °C)  Pulse:  [57-93] 57  Resp:  [18] 18  SpO2:  [92 %-96 %] 93 %  BP: (142-171)/(67-99) 150/72     Weight: 61.5 kg (135 lb 9.3 oz)  Body mass index is 24.8 kg/m².    Intake/Output Summary (Last 24 hours) at 9/30/2018 1327  Last data filed at 9/30/2018 1242  Gross per 24 hour   Intake 2900 ml   Output --   Net 2900 ml      Physical Exam   Constitutional: She is oriented to person, place, and time. She appears well-developed and well-nourished. No distress.   HENT:   Hearing aids in place.   Neck: Normal range of motion. Neck supple.   Cardiovascular: Normal rate, regular rhythm, normal heart sounds and intact distal pulses.   No murmur heard.  Pulmonary/Chest: Effort normal and breath sounds normal. No respiratory distress.   Abdominal: Soft. Bowel sounds are normal. She exhibits no distension and no mass. There is no tenderness. There is no guarding.   Neurological: She is alert and oriented to person, place, and time. GCS eye subscore is 4. GCS verbal subscore is 5. GCS motor subscore is 6.   Skin: Skin is warm and dry. Capillary refill takes less than 2 seconds. She is not diaphoretic. No pallor.   Psychiatric: She has a normal mood and affect. Her  speech is normal. Thought content normal. Cognition and memory are normal.   Nursing note and vitals reviewed.      Significant Labs: All pertinent labs within the past 24 hours have been reviewed.    Significant Imaging: I have reviewed all pertinent imaging results/findings within the past 24 hours.    Assessment/Plan:      * Lower GI bleed    Multiple episodes of rectal bleeding noted per patient's daughter. Occult stool positive. Evidence of BRB with clots on exam in ED. No abdominal pain, N/V, diarrhea. Patient unaware that she was bleeding.    -Hgb stable 10.1>10.7>10.1. Continue to monitor and trend H/H  -PRBC prepared and held - transfuse as needed to keep hgb > 7  -Continue SCD and PPI for prophylaxis  -GI on board, appreciate recommendations  -Clear liquid diet          Left retinal detachment    30 years ago. Blind in left eye          Hard of hearing    Uses hearing aids            VTE Risk Mitigation (From admission, onward)        Ordered     Reason for No Pharmacological VTE Prophylaxis  Once      09/29/18 0841     Place GIOVANNI hose  Until discontinued      09/29/18 0841     Place sequential compression device  Until discontinued      09/29/18 0841     IP VTE HIGH RISK PATIENT  Once      09/29/18 0841          Yasmine Torrez PA-C  Department of Hospital Medicine   [facility]

## 2018-09-30 NOTE — ASSESSMENT & PLAN NOTE
Multiple episodes of rectal bleeding noted per patient's daughter. Occult stool positive. Evidence of BRB with clots on exam in ED. No abdominal pain, N/V, diarrhea. Patient unaware that she was bleeding.    -Hgb stable 10.1>10.7>10.1. Continue to monitor and trend H/H  -PRBC prepared and held - transfuse as needed to keep hgb > 7  -Continue SCD and PPI for prophylaxis  -GI on board, appreciate recommendations  -Clear liquid diet

## 2018-09-30 NOTE — PLAN OF CARE
Problem: Patient Care Overview  Goal: Discharge Needs Assessment  Outcome: Ongoing (interventions implemented as appropriate)  Patient resting throughout shift. Respirations even and unlabored. Skin warm,dry, intact and normal tone of ethnicity. Vital signs stable. Patient denies pain. Patient voiding independently without complications, ambulating per self, with little assistance, to bathroom. Needs repeated teaching on using call light prior to getting up per self. Bed alarm on. Telemetry monitor on and audible displaying no true red alarms. All safety measures maintained. Will continue to monitor.

## 2018-09-30 NOTE — PLAN OF CARE
"  Chief Complaint   Patient presents with    Rectal Bleeding       Per daughter patient "made mess in the bathroom" Reports that she thinks there was blood in her stool. Patient lives with her daughter and reports they had [place] with red sauce last night        Pt independent with ADLs, no HH/HME, lives with supportive daughter, Margarette Pugh, who provides transportation to [facility] and will provide transportation home on discharge    TN provided pt with Discharge Planning Brochure and Business Card and wrote name on whiteboard         09/29/18 1590   Discharge Assessment   Assessment Type Discharge Planning Assessment   Confirmed/corrected address and phone number on facesheet? Yes   Assessment information obtained from? Patient   Expected Length of Stay (days) 2   Communicated expected length of stay with patient/caregiver yes   Prior to hospitilization cognitive status: Alert/Oriented   Prior to hospitalization functional status: Independent   Current cognitive status: Alert/Oriented   Current Functional Status: Needs Assistance   Facility Arrived From: (home)   Lives With child(carmina), adult  (daughter: Margarette Pugh [phone])   Able to Return to Prior Arrangements yes   Is patient able to care for self after discharge? Yes   Who are your caregiver(s) and their phone number(s)? daughter: Margarette Pugh [phone]   Patient's perception of discharge disposition home health   Readmission Within The Last 30 Days no previous admission in last 30 days   Patient currently being followed by outpatient case management? No   Patient currently receives home health services? No   Patient currently receives any other outside agency services? No   Equipment Currently Used at Home none   Do you have any problems affording any of your prescribed medications? No   Is the patient taking medications as prescribed? yes   Does the patient have transportation home? Yes  (daughter: Margarette Pugh [phone])   Transportation Available " family or friend will provide   Dialysis Name and Scheduled days N/A   Does the patient receive services at the [facility]mad[facility]? No   Discharge Plan A Home   Discharge Plan B Home with family   Patient/Family In Agreement With Plan yes     Yancy Taylor RN Transitional Navigator  [phone]

## 2018-09-30 NOTE — PLAN OF CARE
GI Brief Plan of Care Update note:    See attestation to prior note for full A/P, however endoscopic evaluation recommendations reviewed with patient who is agreeable. Prep tonight. Called to review overall status and plan with patient's daughter Margarette ([phone]) who appreciated update and plans to be here tomorrow as well.    Please make NPO at midnight or after patient has finished bowl prep if after midnight.  Not currently on chemical anticoagulation.    Son Huff MD PGY-4  [employer] [facility] Fellow

## 2018-10-01 ENCOUNTER — ANESTHESIA (OUTPATIENT)
Dept: ENDOSCOPY | Facility: HOSPITAL | Age: 83
End: 2018-10-01
Payer: MEDICARE

## 2018-10-01 VITALS
DIASTOLIC BLOOD PRESSURE: 73 MMHG | RESPIRATION RATE: 17 BRPM | OXYGEN SATURATION: 98 % | SYSTOLIC BLOOD PRESSURE: 151 MMHG | HEART RATE: 85 BPM | BODY MASS INDEX: 24.95 KG/M2 | WEIGHT: 135.56 LBS | TEMPERATURE: 98 F | HEIGHT: 62 IN

## 2018-10-01 PROBLEM — K57.90 DIVERTICULOSIS: Status: ACTIVE | Noted: 2018-10-01

## 2018-10-01 PROBLEM — I10 ESSENTIAL HYPERTENSION: Status: ACTIVE | Noted: 2018-10-01

## 2018-10-01 LAB
BASOPHILS # BLD AUTO: 0.02 K/UL
BASOPHILS NFR BLD: 0.3 %
DIFFERENTIAL METHOD: ABNORMAL
EOSINOPHIL # BLD AUTO: 0.1 K/UL
EOSINOPHIL NFR BLD: 1.9 %
ERYTHROCYTE [DISTWIDTH] IN BLOOD BY AUTOMATED COUNT: 15.2 %
HCT VFR BLD AUTO: 34.5 %
HGB BLD-MCNC: 10.7 G/DL
LYMPHOCYTES # BLD AUTO: 1.8 K/UL
LYMPHOCYTES NFR BLD: 26.9 %
MCH RBC QN AUTO: 26.2 PG
MCHC RBC AUTO-ENTMCNC: 31 G/DL
MCV RBC AUTO: 84 FL
MONOCYTES # BLD AUTO: 0.6 K/UL
MONOCYTES NFR BLD: 9.1 %
NEUTROPHILS # BLD AUTO: 4.2 K/UL
NEUTROPHILS NFR BLD: 61.5 %
PLATELET # BLD AUTO: 254 K/UL
PMV BLD AUTO: 9.4 FL
RBC # BLD AUTO: 4.09 M/UL
WBC # BLD AUTO: 6.83 K/UL

## 2018-10-01 PROCEDURE — G0378 HOSPITAL OBSERVATION PER HR: HCPCS

## 2018-10-01 PROCEDURE — 36415 COLL VENOUS BLD VENIPUNCTURE: CPT

## 2018-10-01 PROCEDURE — 90471 IMMUNIZATION ADMIN: CPT | Performed by: HOSPITALIST

## 2018-10-01 PROCEDURE — 43235 EGD DIAGNOSTIC BRUSH WASH: CPT | Performed by: INTERNAL MEDICINE

## 2018-10-01 PROCEDURE — 37000008 HC ANESTHESIA 1ST 15 MINUTES: Performed by: INTERNAL MEDICINE

## 2018-10-01 PROCEDURE — 63600175 PHARM REV CODE 636 W HCPCS: Performed by: FAMILY MEDICINE

## 2018-10-01 PROCEDURE — 37000009 HC ANESTHESIA EA ADD 15 MINS: Performed by: INTERNAL MEDICINE

## 2018-10-01 PROCEDURE — 25000003 PHARM REV CODE 250: Performed by: NURSE ANESTHETIST, CERTIFIED REGISTERED

## 2018-10-01 PROCEDURE — 45385 COLONOSCOPY W/LESION REMOVAL: CPT | Performed by: INTERNAL MEDICINE

## 2018-10-01 PROCEDURE — 96376 TX/PRO/DX INJ SAME DRUG ADON: CPT

## 2018-10-01 PROCEDURE — 63600175 PHARM REV CODE 636 W HCPCS: Performed by: NURSE ANESTHETIST, CERTIFIED REGISTERED

## 2018-10-01 PROCEDURE — 63600175 PHARM REV CODE 636 W HCPCS: Performed by: HOSPITALIST

## 2018-10-01 PROCEDURE — 27201089 HC SNARE, DISP (ANY): Performed by: INTERNAL MEDICINE

## 2018-10-01 PROCEDURE — 96375 TX/PRO/DX INJ NEW DRUG ADDON: CPT

## 2018-10-01 PROCEDURE — 90662 IIV NO PRSV INCREASED AG IM: CPT | Performed by: HOSPITALIST

## 2018-10-01 PROCEDURE — G0008 ADMIN INFLUENZA VIRUS VAC: HCPCS | Performed by: HOSPITALIST

## 2018-10-01 PROCEDURE — 94761 N-INVAS EAR/PLS OXIMETRY MLT: CPT

## 2018-10-01 PROCEDURE — C9113 INJ PANTOPRAZOLE SODIUM, VIA: HCPCS | Performed by: FAMILY MEDICINE

## 2018-10-01 PROCEDURE — 63600175 PHARM REV CODE 636 W HCPCS: Performed by: PHYSICIAN ASSISTANT

## 2018-10-01 PROCEDURE — 88305 TISSUE EXAM BY PATHOLOGIST: CPT | Mod: 26,,, | Performed by: PATHOLOGY

## 2018-10-01 PROCEDURE — 85025 COMPLETE CBC W/AUTO DIFF WBC: CPT

## 2018-10-01 PROCEDURE — 88305 TISSUE EXAM BY PATHOLOGIST: CPT | Performed by: PATHOLOGY

## 2018-10-01 PROCEDURE — 88305 TISSUE SPECIMEN TO PATHOLOGY - SURGERY: ICD-10-PCS | Mod: 26,,, | Performed by: PATHOLOGY

## 2018-10-01 RX ORDER — ETOMIDATE 2 MG/ML
INJECTION INTRAVENOUS
Status: DISCONTINUED | OUTPATIENT
Start: 2018-10-01 | End: 2018-10-01

## 2018-10-01 RX ORDER — HYDRALAZINE HYDROCHLORIDE 20 MG/ML
10 INJECTION INTRAMUSCULAR; INTRAVENOUS EVERY 6 HOURS PRN
Status: DISCONTINUED | OUTPATIENT
Start: 2018-10-01 | End: 2018-10-01 | Stop reason: HOSPADM

## 2018-10-01 RX ORDER — LIDOCAINE HCL/PF 100 MG/5ML
SYRINGE (ML) INTRAVENOUS
Status: DISCONTINUED | OUTPATIENT
Start: 2018-10-01 | End: 2018-10-01

## 2018-10-01 RX ORDER — SODIUM CHLORIDE 9 MG/ML
INJECTION, SOLUTION INTRAVENOUS CONTINUOUS PRN
Status: DISCONTINUED | OUTPATIENT
Start: 2018-10-01 | End: 2018-10-01

## 2018-10-01 RX ORDER — PROPOFOL 10 MG/ML
VIAL (ML) INTRAVENOUS CONTINUOUS PRN
Status: DISCONTINUED | OUTPATIENT
Start: 2018-10-01 | End: 2018-10-01

## 2018-10-01 RX ADMIN — PANTOPRAZOLE SODIUM 40 MG: 40 INJECTION, POWDER, FOR SOLUTION INTRAVENOUS at 08:10

## 2018-10-01 RX ADMIN — LIDOCAINE HYDROCHLORIDE 40 MG: 20 INJECTION, SOLUTION INTRAVENOUS at 11:10

## 2018-10-01 RX ADMIN — ETOMIDATE 4 MG: 2 INJECTION, SOLUTION INTRAVENOUS at 11:10

## 2018-10-01 RX ADMIN — TOPICAL ANESTHETIC 1 EACH: 200 SPRAY DENTAL; PERIODONTAL at 11:10

## 2018-10-01 RX ADMIN — VASOPRESSIN 1 UNITS: 20 INJECTION, SOLUTION INTRAMUSCULAR; SUBCUTANEOUS at 11:10

## 2018-10-01 RX ADMIN — ETOMIDATE 6 MG: 2 INJECTION, SOLUTION INTRAVENOUS at 11:10

## 2018-10-01 RX ADMIN — PROPOFOL 50 MCG/KG/MIN: 10 INJECTION, EMULSION INTRAVENOUS at 11:10

## 2018-10-01 RX ADMIN — SODIUM CHLORIDE: 0.9 INJECTION, SOLUTION INTRAVENOUS at 10:10

## 2018-10-01 RX ADMIN — HYDRALAZINE HYDROCHLORIDE 10 MG: 20 INJECTION INTRAMUSCULAR; INTRAVENOUS at 08:10

## 2018-10-01 RX ADMIN — INFLUENZA A VIRUS A/MICHIGAN/45/2015 X-275 (H1N1) ANTIGEN (FORMALDEHYDE INACTIVATED), INFLUENZA A VIRUS A/SINGAPORE/INFIMH-16-0019/2016 IVR-186 (H3N2) ANTIGEN (FORMALDEHYDE INACTIVATED), AND INFLUENZA B VIRUS B/MARYLAND/15/2016 BX-69A (A B/COLORADO/6/2017-LIKE VIRUS) ANTIGEN (FORMALDEHYDE INACTIVATED) 0.5 ML: 60; 60; 60 INJECTION, SUSPENSION INTRAMUSCULAR at 05:10

## 2018-10-01 NOTE — PLAN OF CARE
Spoke with patient's daughter on phone, explained Moon letter and left the information for her to review, daughter was resistant to the information.       10/01/18 1611   MCDERMOTT Message   Medicare Outpatient and Observation Notification regarding financial responsibility Explained to patient/caregiver;Other (comments)

## 2018-10-01 NOTE — PLAN OF CARE
Problem: Patient Care Overview  Goal: Plan of Care Review  Plan of care reviewed with patient. Voices understanding.Tele monitor in room.Pt NPO after midnight. NSR on monitor with no red alarms noted. Side rails up x3, bed low, bed alarm on, call bell within reach. Will continue to monitor.

## 2018-10-01 NOTE — ASSESSMENT & PLAN NOTE
Pt not on any medications at home. SBP elevated at 170-208 today. She is agitated 2/2 being NPO for scope. No focal neuro deficits noted. IV hydralazine given. Will continue to monitor

## 2018-10-01 NOTE — PLAN OF CARE
TN visited with pt and pt' s daughter Margarette.  Pt going for EGD    TN inquired about PCP as none listed; pt's daughter request appt for pt with Dr. moeller in [place]; Tn scheduled appt for 10/9/18 at 1:00    Per previous TN note:   Pt independent with ADLs, no HH/HME, lives with supportive daughter, Margarette Pugh, who provides transportation to appts and will provide transportation home on discharge    TN to follow.     10/01/18 1030   Discharge Reassessment   Assessment Type Discharge Planning Assessment   Provided patient/caregiver education on the expected discharge date and the discharge plan Yes   Do you have any problems affording any of your prescribed medications? No   Discharge Plan A Home with family   Discharge Plan B Home with family;Home Health   Patient choice form signed by patient/caregiver N/A   Can the patient answer the patient profile reliably? No, cognitively impaired   How does the patient rate their overall health at the present time? Good   Describe the patient's ability to walk at the present time. Minor restrictions or changes   How often would a person be available to care for the patient? Whenever needed   Number of comorbid conditions (as recorded on the chart) Five or more   During the past month, has the patient often been bothered by feeling down, depressed or hopeless? No   During the past month, has the patient often been bothered by little interest or pleasure in doing things? No

## 2018-10-01 NOTE — PLAN OF CARE
TN inquired about PCP as none listed; pt's daughter request appt for pt with Dr. moeller in [place]; Tn scheduled appt for 10/9/18 at 1:00   Pt independent with ADLs, no HH/HME, lives with supportive daughter, Margarette Pugh, who provides transportation to appts and will provide transportation home on discharge  Tn informed Sol, RN floor nurse pt ready for d/c         10/01/18 1637   Final Note   Assessment Type Final Discharge Note   Discharge Disposition Home   What phone number can be called within the next 1-3 days to see how you are doing after discharge? [phone]   Hospital Follow Up  Appt(s) scheduled? Yes   Discharge plans and expectations educations in teach back method with documentation complete? Yes   Right Care Referral Info   Post Acute Recommendation No Care

## 2018-10-01 NOTE — ASSESSMENT & PLAN NOTE
Multiple episodes of rectal bleeding noted per patient's daughter. Occult stool positive. Evidence of BRB with clots on exam in ED. No abdominal pain, N/V, diarrhea. Patient unaware that she was bleeding.    -Hgb stable 10.1>10.7>10.1>10.7. Continue to monitor and trend H/H  -PRBC prepared and held - transfuse as needed to keep hgb > 7  -Continue SCD and PPI for prophylaxis  -GI on board, appreciate recommendations. To have endoscopic eval today

## 2018-10-01 NOTE — [employer] PATIENT INSTRUCTIONS
Discharge Summary/Instructions after an Endoscopic Procedure  Patient Name: Yeimi Webb  Patient MRN: [id number]  Patient YOB: 1926 Monday, October 01, 2018  Gama Jarrett MD  RESTRICTIONS:  During your procedure today, you received medications for sedation.  These   medications may affect your judgment, balance and coordination.  Therefore,   for 24 hours, you have the following restrictions:   - DO NOT drive a car, operate machinery, make legal/financial decisions,   sign important papers or drink alcohol.    ACTIVITY:  Today: no heavy lifting, straining or running due to procedural   sedation/anesthesia.  The following day: return to full activity including work.  DIET:  Eat and drink normally unless instructed otherwise.     TREATMENT FOR COMMON SIDE EFFECTS:  - Mild abdominal pain, nausea, belching, bloating or excessive gas:  rest,   eat lightly and use a heating pad.  - Sore Throat: treat with throat lozenges and/or gargle with warm salt   water.  - Because air was used during the procedure, expelling large amounts of air   from your rectum or belching is normal.  - If a bowel prep was taken, you may not have a bowel movement for 1-3 days.    This is normal.  SYMPTOMS TO WATCH FOR AND REPORT TO YOUR PHYSICIAN:  1. Abdominal pain or bloating, other than gas cramps.  2. Chest pain.  3. Back pain.  4. Signs of infection such as: chills or fever occurring within 24 hours   after the procedure.  5. Rectal bleeding, which would show as bright red, maroon, or black stools.   (A tablespoon of blood from the rectum is not serious, especially if   hemorrhoids are present.)  6. Vomiting.  7. Weakness or dizziness.  GO DIRECTLY TO THE NEAREST EMERGENCY ROOM IF YOU HAVE ANY OF THE FOLLOWING:      Difficulty breathing              Chills and/or fever over 101 F   Persistent vomiting and/or vomiting blood   Severe abdominal pain   Severe chest pain   Black, tarry stools   Bleeding- more than one  tablespoon   Any other symptom or condition that you feel may need urgent attention  Your doctor recommends these additional instructions:  If any biopsies were taken, your doctors clinic will contact you in 1 to 2   weeks with any results.  Colonoscopy today  For questions, problems or results please call your physician - Gama Jarrett MD at Work:  [phone].  EMERGENCY PHONE NUMBER: [phone],  LAB RESULTS: [phone]  IF A COMPLICATION OR EMERGENCY SITUATION ARISES AND YOU ARE UNABLE TO REACH   YOUR PHYSICIAN - GO DIRECTLY TO THE EMERGENCY ROOM.  MD Gama Mcallister MD  10/1/2018 12:23:05 PM  This report has been verified and signed electronically.  [employer]

## 2018-10-01 NOTE — ANESTHESIA POSTPROCEDURE EVALUATION
"Anesthesia Post Evaluation    Patient: Yeimi Webb    Procedure(s) Performed: Procedure(s) (LRB):  EGD (ESOPHAGOGASTRODUODENOSCOPY) (N/A)  COLONOSCOPY (N/A)    Final Anesthesia Type: MAC  Patient location during evaluation: GI PACU  Patient participation: Yes- Able to Participate  Level of consciousness: awake and alert and oriented  Post-procedure vital signs: reviewed and stable  Pain management: adequate  Airway patency: patent  PONV status at discharge: No PONV  Anesthetic complications: no      Cardiovascular status: blood pressure returned to baseline and hemodynamically stable  Respiratory status: unassisted, spontaneous ventilation and nasal cannula  Hydration status: euvolemic  Follow-up not needed.        Visit Vitals  BP (!) 156/78 (BP Location: Left arm, Patient Position: Lying)   Pulse 86   Temp 36.6 °C (97.8 °F) (Oral)   Resp 16   Ht 5' 2" (1.575 m)   Wt 61.5 kg (135 lb 9.3 oz)   SpO2 (!) 94%   Breastfeeding? No   BMI 24.80 kg/m²       Pain/Dwight Score: Pain Assessment Performed: Yes (10/1/2018  9:01 AM)  Presence of Pain: denies (10/1/2018  9:01 AM)  Pain Rating Post Med Admin: 0 (9/30/2018  3:00 AM)        "

## 2018-10-01 NOTE — PLAN OF CARE
Pt. Procedure completed with no complications.Vital signs stable.Pt. aao x3.Pt. To remain in the recovery area for 30 minuets and stable.Pt. Denies any painor discomfort at this time.will continue to monitor.

## 2018-10-01 NOTE — TRANSFER OF CARE
"Anesthesia Transfer of Care Note    Patient: Yeimi Webb    Procedure(s) Performed: Procedure(s) (LRB):  EGD (ESOPHAGOGASTRODUODENOSCOPY) (N/A)  COLONOSCOPY (N/A)    Patient location: GI    Anesthesia Type: MAC    Transport from OR: Transported from OR on 2-3 L/min O2 by NC with adequate spontaneous ventilation    Post pain: adequate analgesia    Post assessment: no apparent anesthetic complications    Post vital signs: stable    Level of consciousness: awake, alert and oriented    Nausea/Vomiting: no nausea/vomiting    Complications: none    Transfer of care protocol was followed      Last vitals:   Visit Vitals  BP (!) 156/78 (BP Location: Left arm, Patient Position: Lying)   Pulse 86   Temp 36.6 °C (97.8 °F) (Oral)   Resp 16   Ht 5' 2" (1.575 m)   Wt 61.5 kg (135 lb 9.3 oz)   SpO2 (!) 94%   Breastfeeding? No   BMI 24.80 kg/m²     "

## 2018-10-01 NOTE — SUBJECTIVE & OBJECTIVE
Interval History: Pt examined at bedside. Her daughter is present. To have endoscopic eval today, completed prep last night. She had 3 bloody stools yesterday. Hgb stable. BP elevated today and pt is very agitated because she is NPO. No headache, weakness, changes in speech, weakness or any other focal neuro deficits noted. No other acute complaints.    Review of Systems   Constitutional: Negative for appetite change, chills, fatigue, fever and unexpected weight change.   HENT: Positive for hearing loss (Chronic). Negative for trouble swallowing and voice change.    Eyes: Positive for visual disturbance (Chronic).   Respiratory: Negative for cough and shortness of breath.    Cardiovascular: Negative for chest pain, palpitations and leg swelling.   Gastrointestinal: Positive for blood in stool. Negative for abdominal distention, abdominal pain, constipation, diarrhea, nausea, rectal pain and vomiting.   Skin: Negative for pallor and wound.   Neurological: Negative for syncope, facial asymmetry, speech difficulty, weakness, numbness and headaches.   Psychiatric/Behavioral: Positive for agitation.   All other systems reviewed and are negative.    Objective:     Vital Signs (Most Recent):  Temp: 97.8 °F (36.6 °C) (10/01/18 0740)  Pulse: 69 (10/01/18 0904)  Resp: 16 (10/01/18 0740)  BP: (!) 170/76 (10/01/18 0904)  SpO2: (!) 94 % (10/01/18 0850) Vital Signs (24h Range):  Temp:  [96 °F (35.6 °C)-98.7 °F (37.1 °C)] 97.8 °F (36.6 °C)  Pulse:  [57-83] 69  Resp:  [16-24] 16  SpO2:  [92 %-98 %] 94 %  BP: (140-208)/(68-96) 170/76     Weight: 61.5 kg (135 lb 9.3 oz)  Body mass index is 24.8 kg/m².    Intake/Output Summary (Last 24 hours) at 10/1/2018 0949  Last data filed at 9/30/2018 1800  Gross per 24 hour   Intake 2700 ml   Output --   Net 2700 ml      Physical Exam   Constitutional: She is oriented to person, place, and time. She appears well-developed and well-nourished. No distress.   HENT:   Hearing aids in place.   Eyes:    L eye chronic corneal changes noted.   Neck: Normal range of motion. Neck supple.   Cardiovascular: Normal rate, regular rhythm, normal heart sounds and intact distal pulses.   No murmur heard.  Pulmonary/Chest: Effort normal and breath sounds normal. No respiratory distress.   Abdominal: Soft. Bowel sounds are normal. She exhibits no distension and no mass. There is no tenderness. There is no guarding.   Neurological: She is alert and oriented to person, place, and time. No cranial nerve deficit or sensory deficit. GCS eye subscore is 4. GCS verbal subscore is 5. GCS motor subscore is 6.   Skin: Skin is warm and dry. Capillary refill takes less than 2 seconds. She is not diaphoretic. No pallor.   Psychiatric: She has a normal mood and affect. Her speech is normal. Thought content normal. She is agitated. Cognition and memory are normal.   Nursing note and vitals reviewed.      Significant Labs: All pertinent labs within the past 24 hours have been reviewed.    Significant Imaging: I have reviewed all pertinent imaging results/findings within the past 24 hours.

## 2018-10-01 NOTE — PLAN OF CARE
Pt has just arrived from endoscopy. VS documented in flowsheet. PT is AAOx3. No pain reported except for hunger pains. Pt is grumpy and asking for her food. Notified Jenna.

## 2018-10-01 NOTE — PLAN OF CARE
Problem: Patient Care Overview  Goal: Plan of Care Review  Outcome: Ongoing (interventions implemented as appropriate)  Plan of care reviewed with patient, understanding verbalized.  Pt remains SR on tele. Pt completed prep and had several large bowel movements overnight.  Bed alarm on, call light in reach, fall precautions in place.  Will continue to monitor.

## 2018-10-01 NOTE — PLAN OF CARE
Pt discharge to home. Pt is stable. Discharge teaching discussed with pt and daughter, Margarette at the bedside. Discussed about the new medications and its side effects and upcoming appts. Verbalized clear understanding. IV removed and exhibit no signs of active bleeding. No complaints of discomfort or pain. No respiratory distress noted. Telemetry box removed. Waiting for transport.

## 2018-10-01 NOTE — PLAN OF CARE
Report received from NINO Mayorga . NPO status confirmed. NPO since midnight. Patient completed prep at 0200 . Prep results clear. Patent IV access.

## 2018-10-01 NOTE — [employer] PATIENT INSTRUCTIONS
Discharge Summary/Instructions after an Endoscopic Procedure  Patient Name: Yeimi Webb  Patient MRN: [id number]  Patient YOB: 1926 Monday, October 01, 2018  Gama Jarrett MD  RESTRICTIONS:  During your procedure today, you received medications for sedation.  These   medications may affect your judgment, balance and coordination.  Therefore,   for 24 hours, you have the following restrictions:   - DO NOT drive a car, operate machinery, make legal/financial decisions,   sign important papers or drink alcohol.    ACTIVITY:  Today: no heavy lifting, straining or running due to procedural   sedation/anesthesia.  The following day: return to full activity including work.  DIET:  Eat and drink normally unless instructed otherwise.     TREATMENT FOR COMMON SIDE EFFECTS:  - Mild abdominal pain, nausea, belching, bloating or excessive gas:  rest,   eat lightly and use a heating pad.  - Sore Throat: treat with throat lozenges and/or gargle with warm salt   water.  - Because air was used during the procedure, expelling large amounts of air   from your rectum or belching is normal.  - If a bowel prep was taken, you may not have a bowel movement for 1-3 days.    This is normal.  SYMPTOMS TO WATCH FOR AND REPORT TO YOUR PHYSICIAN:  1. Abdominal pain or bloating, other than gas cramps.  2. Chest pain.  3. Back pain.  4. Signs of infection such as: chills or fever occurring within 24 hours   after the procedure.  5. Rectal bleeding, which would show as bright red, maroon, or black stools.   (A tablespoon of blood from the rectum is not serious, especially if   hemorrhoids are present.)  6. Vomiting.  7. Weakness or dizziness.  GO DIRECTLY TO THE NEAREST EMERGENCY ROOM IF YOU HAVE ANY OF THE FOLLOWING:      Difficulty breathing              Chills and/or fever over 101 F   Persistent vomiting and/or vomiting blood   Severe abdominal pain   Severe chest pain   Black, tarry stools   Bleeding- more than one  tablespoon   Any other symptom or condition that you feel may need urgent attention  Your doctor recommends these additional instructions:  If any biopsies were taken, your doctors clinic will contact you in 1 to 2   weeks with any results.  Supportive care alone for suspected resolved LGIB due to diverticulosis vs   internal hemorrhoids  For questions, problems or results please call your physician - Gama Jarrett MD at Work:  [phone].  EMERGENCY PHONE NUMBER: [phone],  LAB RESULTS: [phone]  IF A COMPLICATION OR EMERGENCY SITUATION ARISES AND YOU ARE UNABLE TO REACH   YOUR PHYSICIAN - GO DIRECTLY TO THE EMERGENCY ROOM.  MD Gama Mcallister MD  10/1/2018 12:25:09 PM  This report has been verified and signed electronically.  [employer]

## 2018-10-02 LAB
BLD PROD TYP BPU: NORMAL
BLD PROD TYP BPU: NORMAL
BLOOD UNIT EXPIRATION DATE: NORMAL
BLOOD UNIT EXPIRATION DATE: NORMAL
BLOOD UNIT TYPE CODE: 600
BLOOD UNIT TYPE CODE: 600
BLOOD UNIT TYPE: NORMAL
BLOOD UNIT TYPE: NORMAL
CODING SYSTEM: NORMAL
CODING SYSTEM: NORMAL
DISPENSE STATUS: NORMAL
DISPENSE STATUS: NORMAL
TRANS ERYTHROCYTES VOL PATIENT: NORMAL ML
TRANS ERYTHROCYTES VOL PATIENT: NORMAL ML

## 2018-10-02 NOTE — DISCHARGE SUMMARY
[facility]  Discharge Summary      Patient Name: Yeimi Webb  MRN: [id number]  Admission Date: 9/28/2018  Hospital Length of Stay: 3 days  Discharge Date and Time: 10/1/2018  6:54 PM  Attending Physician: Chicho Mistry MD  Discharging Provider: Yasmine Torrez PA-C  Primary Care Provider: Karen Koch MD      HPI:   Ms. Webb is a 89 y/o pleasant white female with left eye blindness, retinal detachment left, and hearing loss who presented to [facility] with active rectal bleeding. Her daughter noticed 2 episodes since she noted bleeding in the bathroom. Patient unaware at that time. No abdominal pain/nausea/vomiting/diarrhea. She denies any NSAID/aspirin or goody powder use. Patient [place] a year ago and then she moved here along with her daughter. Patient is unaware about her last colonoscopy. She denies prior similar episodes. She denies any prior surgeries or hospitalizations. No other complaints. On presentation, she is found to have stable vital signs with active rectal bleeding and drop in hemoglobin/hematocrit from 13.5/43.2 to 10.1/33.3. She was transferred to [facility] and admitted to [facility] for observation, transfusion if needed and GI consultation.    Procedure(s) (LRB):  EGD (ESOPHAGOGASTRODUODENOSCOPY) (N/A)  COLONOSCOPY (N/A)      Hospital Course:   Gastroenterology was consulted and recommended clear liquid diet with close monitoring of H/H and for signs of active bleeding. Hemoglobin remained stable at 10.7 > 10.1> 10.7. She completed bowel prep on 9/30/18 and was scheduled for endoscopic evaluation on 10/1/18. Her blood pressure was elevated at 208/68 and IV hydralazine was given with improvement of SBP to the 140s. Patient underwent upper endoscopy and colonoscopy on 10/1/2018 without complication. Colonoscopy showed non-bleeding internal hemorrhoids, colonic  diverticula, a 5mm polyp that was resected, no evidence of ischemic colitis, malignancy or colonic angioectasias. Upper endoscopy showed tortuous esophagus, J-shaped stomach with deformity attributed to a large hiatal hernia, non-obstructing, benign-appearing Schatzki ring, no evidence of active bleeding. Hemodynamically stable and ready for discharge. She was discharged to home in good condition and will follow up with her primary care physician.     Consults:   Consults (From admission, onward)        Status Ordering Provider     Inpatient consult to Anesthesiology  Once     Provider:  (Not yet assigned)    Acknowledged LB ROSA     Inpatient consult to [facility]  Once     Provider:  (Not yet assigned)    Completed RAFFY PRICE     Inpatient consult to Social Work/Case Management  Once     Provider:  (Not yet assigned)    Acknowledged ELLIOT GRADY          No new Assessment & Plan notes have been filed under this hospital service since the last note was generated.  Service: Hospital Medicine    Final Active Diagnoses:    Diagnosis Date Noted POA    PRINCIPAL PROBLEM:  Lower GI bleed [K92.2] 09/28/2018 Yes    Essential hypertension [I10] 10/01/2018 Unknown    Diverticulosis [K57.90] 10/01/2018 Yes    Melena [K92.1]  Yes    Polyp of colon [K63.5]  Yes    Hard of hearing [H91.90] 09/28/2018 Yes    Left retinal detachment [H33.22] 09/28/2018 Yes      Problems Resolved During this Admission:       Discharged Condition: good    Disposition: Home or Self Care    Follow Up:  Follow-up Information     Karen Koch MD On 10/9/2018.    Specialties:  Internal Medicine, Infectious Diseases  Why:  Time:1:00  Contact information:  [address] [place] [address]  SUITE   [facility] [address] [phone]                 Patient Instructions:      Diet Adult Regular     Notify your health care provider if you experience any of the following:  temperature >100.4     Notify your health care provider  if you experience any of the following:  persistent nausea and vomiting or diarrhea     Notify your health care provider if you experience any of the following:  severe uncontrolled pain     Notify your health care provider if you experience any of the following:  increased confusion or weakness     Notify your health care provider if you experience any of the following:  persistent dizziness, light-headedness, or visual disturbances     Notify your health care provider if you experience any of the following:  severe persistent headache     Activity as tolerated       Significant Diagnostic Studies: Labs: All labs within the past 24 hours have been reviewed    Pending Diagnostic Studies:     None         Medications:  Reconciled Home Medications:      Medication List      You have not been prescribed any medications.         Indwelling Lines/Drains at time of discharge:   Lines/Drains/Airways          None          Time spent on the discharge of patient: 35 minutes  Patient was seen and examined on the date of discharge and determined to be suitable for discharge.      CIERRA Richardson MD  [facility]  Chicho Mistry MD, [facility]   MD Jenna Torres PA-C Renee Melancon, PA-C Kristin Stein, PA-C Arekeva Selmon, NP-C  [address] [place] [id number]  Office Phone: [phone]  Office Fax: [phone]

## 2018-10-04 ENCOUNTER — TELEPHONE (OUTPATIENT)
Dept: NEUROLOGY | Facility: HOSPITAL | Age: 83
End: 2018-10-04

## 2018-10-04 NOTE — TELEPHONE ENCOUNTER
Called to review pathology from recent colonoscopy 10/1/18 with polyp removed, tubular adenoma (no cancers). Successfully discussed with patient's daughter (her contact in chart) after verification of patient identifiers.